# Patient Record
Sex: MALE
[De-identification: names, ages, dates, MRNs, and addresses within clinical notes are randomized per-mention and may not be internally consistent; named-entity substitution may affect disease eponyms.]

---

## 2022-02-03 PROBLEM — Z00.00 ENCOUNTER FOR PREVENTIVE HEALTH EXAMINATION: Status: ACTIVE | Noted: 2022-02-03

## 2022-02-04 ENCOUNTER — APPOINTMENT (OUTPATIENT)
Dept: OTOLARYNGOLOGY | Facility: CLINIC | Age: 67
End: 2022-02-04
Payer: COMMERCIAL

## 2022-02-04 DIAGNOSIS — Z83.3 FAMILY HISTORY OF DIABETES MELLITUS: ICD-10-CM

## 2022-02-04 DIAGNOSIS — Z82.49 FAMILY HISTORY OF ISCHEMIC HEART DISEASE AND OTHER DISEASES OF THE CIRCULATORY SYSTEM: ICD-10-CM

## 2022-02-04 DIAGNOSIS — H61.23 IMPACTED CERUMEN, BILATERAL: ICD-10-CM

## 2022-02-04 DIAGNOSIS — Z86.79 PERSONAL HISTORY OF OTHER DISEASES OF THE CIRCULATORY SYSTEM: ICD-10-CM

## 2022-02-04 DIAGNOSIS — Z87.891 PERSONAL HISTORY OF NICOTINE DEPENDENCE: ICD-10-CM

## 2022-02-04 DIAGNOSIS — H93.13 TINNITUS, BILATERAL: ICD-10-CM

## 2022-02-04 PROCEDURE — 92550 TYMPANOMETRY & REFLEX THRESH: CPT

## 2022-02-04 PROCEDURE — 69210 REMOVE IMPACTED EAR WAX UNI: CPT

## 2022-02-04 PROCEDURE — 92557 COMPREHENSIVE HEARING TEST: CPT

## 2022-02-04 PROCEDURE — 99203 OFFICE O/P NEW LOW 30 MIN: CPT | Mod: 25

## 2022-02-04 RX ORDER — ATORVASTATIN CALCIUM 80 MG/1
TABLET, FILM COATED ORAL
Refills: 0 | Status: ACTIVE | COMMUNITY

## 2022-02-04 RX ORDER — CITALOPRAM 10 MG/1
TABLET, FILM COATED ORAL
Refills: 0 | Status: ACTIVE | COMMUNITY

## 2022-02-04 RX ORDER — AMLODIPINE BESYLATE 5 MG/1
TABLET ORAL
Refills: 0 | Status: ACTIVE | COMMUNITY

## 2022-02-04 NOTE — HISTORY OF PRESENT ILLNESS
[de-identified] : ATIF SMITH is a 66 year patient With bilateral ear fullness and tinnitus since he had upper respiratory tract infection in December. He has no otalgia, otorrhea, or dizziness. He has no nasal or throat symptoms. He has been using Flonase. He also uses a small instrument to remove wax from the ears. He does not smoke. He has no history of recurrent ear infections, prior otologic surgery, or ear trauma. He has some noise exposure from listening to music. He said an audiogram about 10 years ago was normal

## 2022-02-04 NOTE — ASSESSMENT
[FreeTextEntry1] : Cerumen impaction was removed from the ears. He felt better afterwards. Audiogram showed normal hearing with a borderline mild high-frequency sensorineural hearing loss\par \par PLAN\par \par -findings and management options discussed in detail with the patient. \par -good aural hygiene\par -avoid using cotton swabs in the ears\par -wax removal drops such as Debrox as needed. \par -noise precautions\par -annual audiogram\par -follow up in one year or earlier if needed to check his ears

## 2022-07-22 ENCOUNTER — APPOINTMENT (OUTPATIENT)
Dept: OTOLARYNGOLOGY | Facility: CLINIC | Age: 67
End: 2022-07-22

## 2022-07-22 VITALS — WEIGHT: 190 LBS | TEMPERATURE: 97.4 F | BODY MASS INDEX: 26.6 KG/M2 | HEIGHT: 71 IN

## 2022-07-22 DIAGNOSIS — R42 DIZZINESS AND GIDDINESS: ICD-10-CM

## 2022-07-22 DIAGNOSIS — H93.299 OTHER ABNORMAL AUDITORY PERCEPTIONS, UNSPECIFIED EAR: ICD-10-CM

## 2022-07-22 PROCEDURE — 99213 OFFICE O/P EST LOW 20 MIN: CPT

## 2022-07-22 NOTE — HISTORY OF PRESENT ILLNESS
[de-identified] : ATIF SMITH is a 66 year old patient here to check his ears for cerumen impaction.  He has a little bit of fullness but no otalgia, otorrhea, or tinnitus.  He has also been having vertigo.  He said that he has a spinning sensation in the morning when he turns from left to right which lasts 30 seconds.  It has been occurring for the past 2 weeks.  He had COVID about 4 weeks ago.  He has not noticed a change in his hearing.  He denies neurological symptoms.  He has tightness in his neck as well\par \par No history of recurrent middle ear infections, prior otologic surgery, or ear trauma\par He has history of noise exposure from listening to music\par Audiogram in February 2022 showed a mild bilateral high-frequency sensorineural hearing loss

## 2022-07-22 NOTE — CONSULT LETTER
[Dear  ___] : Dear  [unfilled], [Courtesy Letter:] : I had the pleasure of seeing your patient, [unfilled], in my office today. [Please see my note below.] : Please see my note below. [Consult Closing:] : Thank you very much for allowing me to participate in the care of this patient.  If you have any questions, please do not hesitate to contact me. [Sincerely,] : Sincerely, [FreeTextEntry3] : Cyn Ramírez MD\par

## 2022-07-22 NOTE — ASSESSMENT
[FreeTextEntry1] : He has a history of mild bilateral high-frequency sensorineural hearing loss.  His ears were normal on exam.  There is no cerumen impaction.  He did have COVID about 4 weeks ago.  He has had intermittent dizziness and vertigo since then.  His symptoms are suggestive of benign positional vertigo.  Lamar-Hallpike testing however was negative.  It started about 2 weeks ago.  He denies neurological symptoms.  He said he saw his PCP\par \par PLAN\par \par -findings and management options discussed in detail with the patient. \par -good aural hygiene\par -Monitor hearing.  Consider repeat test depending on how he does.  He does not report a change in his hearing at this time\par -I Am sending him for vestibular therapy.\par -Consider specialized inner ear testing and neurology evaluation if he is not improving\par -I asked him to call in 2 weeks to let me know how he is feeling.  If his symptoms have not improved, we will send him for further testing.  I will also have him return for an audiogram.\par -call and return earlier if any concerns.

## 2023-12-21 ENCOUNTER — APPOINTMENT (OUTPATIENT)
Dept: OTOLARYNGOLOGY | Facility: CLINIC | Age: 68
End: 2023-12-21
Payer: COMMERCIAL

## 2023-12-21 PROCEDURE — 99213 OFFICE O/P EST LOW 20 MIN: CPT | Mod: 25

## 2023-12-21 PROCEDURE — 31231 NASAL ENDOSCOPY DX: CPT

## 2023-12-21 NOTE — HISTORY OF PRESENT ILLNESS
[de-identified] : ATIF SMITH is a 68 year old patient With a history of recurrent cerumen impaction and mild sensorineural hearing loss.  He had vertigo at his last visit which has since resolved.  He has had epistaxis from the right side of his nose.  He has intermittent episodes which can last up to 15 minutes.  The last episode was approximately 2 weeks ago.  He does have a history of chronic Afrin use for nasal congestion and obstruction.  He denies a history of nasal surgery but that has had trauma from boxing.  He also has a history of snoring.  His wife thinks he may have occasional gasping for air.  He denies daytime fatigue or restless sleep.  ENT history he has bilateral high frequency SNHL No history of recurrent middle ear infections, prior otologic surgery, or ear trauma  He has history of noise exposure from listening to music

## 2023-12-21 NOTE — ASSESSMENT
[FreeTextEntry1] : He has history of intermittent epistaxis from the right of his nose.  The last episode was approximately 2 weeks ago.  He also suffers from chronic rhinitis and is a chronic user of Afrin.  On exam, he has a deviated septum and inferior turbinate hypertrophy.  There was no active bleeding, clots, or lesions on exam today.  The likely source was the nasal septum.  He also had findings consistent with reflux on flexible laryngoscopy  He has a history of snoring.  Plan -Findings and management options were discussed with the patient and his wife -Nasal saline rinses, moisturizing nasal gel, and humidifier -If he does not have bleeding, he may try nasal steroid spray -He may try Breathe Right strips -I recommended that he stop using Afrin as it can cause complications such as rhinitis medicamentosa -If he has recurrent bleeding, I have asked him to call me.  I can cauterize the source.  Since there was no bleeding or recent bleeding on exam today, there was no obvious area to cauterize. -I recommended reflux precautions.  He was given literature -He could consider nasal procedures to improve his breathing -If he and his wife are concerned about obstructive sleep apnea, I recommend a sleep study.  They are going to discuss this -I recommended follow-up in approximately 2 to 3 weeks.  I will see if he like to consider sleep study at that time.  He was told to call me and return urgently if he has recurrent bleeding.

## 2024-01-05 ENCOUNTER — APPOINTMENT (OUTPATIENT)
Dept: OTOLARYNGOLOGY | Facility: CLINIC | Age: 69
End: 2024-01-05
Payer: COMMERCIAL

## 2024-01-05 DIAGNOSIS — J34.2 DEVIATED NASAL SEPTUM: ICD-10-CM

## 2024-01-05 DIAGNOSIS — J31.0 CHRONIC RHINITIS: ICD-10-CM

## 2024-01-05 DIAGNOSIS — R04.0 EPISTAXIS: ICD-10-CM

## 2024-01-05 DIAGNOSIS — R06.83 SNORING: ICD-10-CM

## 2024-01-05 PROCEDURE — 30901 CONTROL OF NOSEBLEED: CPT

## 2024-01-05 PROCEDURE — 99212 OFFICE O/P EST SF 10 MIN: CPT | Mod: 25

## 2024-01-05 NOTE — PHYSICAL EXAM
[TextEntry] : PHYSICAL EXAM  General: The patient was alert, oriented and in no distress. Voice was clear.  Face: The patient had no facial asymmetry or mass. The skin was unremarkable.  Ears: Hearing normal to conversational voice External ears were normal without deformity.  Nose:  The external nose had no significant deformity.     On anterior rhinoscopy, the nasal mucosa was dry on the right side.   The anterior septum was grossly midline. There were no visualized polyps, purulence  or masses.   Neck:  The neck was symmetrical.

## 2024-01-05 NOTE — ASSESSMENT
[FreeTextEntry1] : He has had recurrent right epistaxis.  The bleeding source on the septum was cauterized.  He tolerated this well.  Plan -Findings and management options were discussed with the patient. -Continue precautions for epistaxis -Avoid heavy lifting, bending, straining, nasal manipulation, and strenuous exercise -Saline spray as needed.  He should hold off on using the nasal steroid spray on the right side -Bacitracin ointment twice daily for 1 to 2 weeks -He will consider the sleep study -I will see him back in approximately 2 weeks -He will call and return earlier if he has any recurrent bleeding or any other problems

## 2024-01-05 NOTE — HISTORY OF PRESENT ILLNESS
[de-identified] : ATIF SMITH is a 68 year old patient Here for recurrent epistaxis.  He has had intermittent bleeding from the right side of his nose.  He has been using the moisturizing nasal gel.  He has stopped using Afrin.  He had been using Flonase.  He wishes to have the bleeding source cauterized.  ENT history he has bilateral high frequency SNHL No history of recurrent middle ear infections, prior otologic surgery, or ear trauma  He has history of noise exposure from listening to music  He has snoring.  He has a history of nasal trauma but no history of nasal surgery. NE/FL- DNS. no lesions

## 2025-05-07 ENCOUNTER — APPOINTMENT (OUTPATIENT)
Dept: OTOLARYNGOLOGY | Facility: CLINIC | Age: 70
End: 2025-05-07
Payer: COMMERCIAL

## 2025-05-07 DIAGNOSIS — J31.0 CHRONIC RHINITIS: ICD-10-CM

## 2025-05-07 DIAGNOSIS — J34.2 DEVIATED NASAL SEPTUM: ICD-10-CM

## 2025-05-07 DIAGNOSIS — H61.23 IMPACTED CERUMEN, BILATERAL: ICD-10-CM

## 2025-05-07 DIAGNOSIS — R04.0 EPISTAXIS: ICD-10-CM

## 2025-05-07 PROCEDURE — 69210 REMOVE IMPACTED EAR WAX UNI: CPT

## 2025-05-07 PROCEDURE — 99213 OFFICE O/P EST LOW 20 MIN: CPT | Mod: 25

## 2025-05-12 ENCOUNTER — APPOINTMENT (OUTPATIENT)
Dept: OTOLARYNGOLOGY | Facility: CLINIC | Age: 70
End: 2025-05-12
Payer: COMMERCIAL

## 2025-05-12 DIAGNOSIS — H90.3 SENSORINEURAL HEARING LOSS, BILATERAL: ICD-10-CM

## 2025-05-12 PROCEDURE — 92557 COMPREHENSIVE HEARING TEST: CPT

## 2025-05-12 PROCEDURE — 92567 TYMPANOMETRY: CPT

## 2025-08-28 ENCOUNTER — APPOINTMENT (OUTPATIENT)
Dept: NEUROLOGY | Age: 70
End: 2025-08-28